# Patient Record
Sex: MALE | HISPANIC OR LATINO | Employment: FULL TIME | URBAN - METROPOLITAN AREA
[De-identification: names, ages, dates, MRNs, and addresses within clinical notes are randomized per-mention and may not be internally consistent; named-entity substitution may affect disease eponyms.]

---

## 2024-07-17 ENCOUNTER — OFFICE VISIT (OUTPATIENT)
Dept: URGENT CARE | Facility: URGENT CARE | Age: 24
End: 2024-07-17
Payer: COMMERCIAL

## 2024-07-17 VITALS
HEART RATE: 67 BPM | TEMPERATURE: 98.1 F | OXYGEN SATURATION: 97 % | RESPIRATION RATE: 12 BRPM | DIASTOLIC BLOOD PRESSURE: 84 MMHG | SYSTOLIC BLOOD PRESSURE: 127 MMHG

## 2024-07-17 DIAGNOSIS — R07.89 CHEST WALL PAIN: Primary | ICD-10-CM

## 2024-07-17 PROCEDURE — 99203 OFFICE O/P NEW LOW 30 MIN: CPT | Performed by: PHYSICIAN ASSISTANT

## 2024-07-17 NOTE — PROGRESS NOTES
"Assessment & Plan     Chest wall pain  Pt has pain localized to the zyphoid process and lower costochondral junction which is reproducible with palpation.  Suspect possible rib separation. When papating pt did not a \"shifting\" sensation.   He is vitally normal, lungs clear and abdomen without tenderness to palpation. There are no palpable masses on exam.  Pt is concerned about hernia. I do not appreciate an umbilical, abdomen wall hernia.  He has no recurrent substernal chest pain or reflux and low concern for hiatial hernia, but discussed would not expect reproducible pain with palpation nor would it change our treatment plan today.  Recommend relative rest, ibuprofen or naproxen for pain, avoid activity that bothers.  Recommend he follow-up with primary care for persistent or worsening sx.               Eda Cali PA-C  [facility] PRANEETH Tafoya is a 23 year old male who presents to clinic today for the following health issues:  Chief Complaint   Patient presents with    Hernia     Patient here with concern of possible hernia. States he was feeling soreness around his chest area Sunday and then after a few days, that initial pain went away and now he is feeling a pain that is just below his sternum.        HPI  Lump noted on the lower sternal region to the L.  He feels a lump in this area and has pain with palpation.      Sx started yesterday but had similar sx about a week ago, got better and then returned.  This started after some bad coughing spells.  Pain is primarily  with movement or pressing on it.    No nausea, vomiting. No pleuritic pain. No SOB  Burping more.    No change in pain with eating, does not improve or worsening with eating.  Has not tried anything for this pain.  He was told by a friend they had similar sx with a hernia.         Review of Systems  Constitutional, HEENT, cardiovascular, pulmonary, gi and gu systems are negative, except as otherwise " noted.      Objective    /84 (BP Location: Left arm, Patient Position: Sitting, Cuff Size: Adult Large)   Pulse 67   Temp 98.1  F (36.7  C) (Tympanic)   Resp 12   SpO2 97%   Physical Exam   Pt is in no acute distress and appears well  Ears patent B:  TM s intact, non-injected. All land marks easily visibile    Nasal mucosa is non-edematous, no discharge.    Pharynx: non erythematous, tonsils non hypertrophied, No exudate   Neck supple: no adenopathy  Lungs: CTA  Heart: RRR, no murmur, no thrills or heaves   Ext: no edema  Skin: no rashes    Abdomen: BS active, abdomen is rotund but not distended.  Soft, ND, NT to light or deep papation.    No rebuond or peritoneal signs. No masses or hsm.  There is TTP at the zyphoid process and reproducible pain L lower sternocostal margin.  No pain with AP or lateral compression of the chest.

## 2024-07-18 NOTE — PATIENT INSTRUCTIONS
Suspect related to rib separation on the L    Try Ice, relative rest, naproxen OTC or ibuprofen OTC for pain.    Follow up for sigificant short of breath, difficulty breathing or worsening symptoms.